# Patient Record
(demographics unavailable — no encounter records)

---

## 2025-04-28 NOTE — PLAN
[No delays noted, anticipatory developmental guidance given.] : No delays noted, anticipatory developmental guidance given.  [Safety counseling given regarding major safety issues for children this age.] : Safety counseling given regarding major safety issues for children this age. [Reading daily was encouraged.] : Reading daily was encouraged.  [FreeTextEntry1] : IVIS will return for a follow up visit when he turns 2 years old.   Yohannes Núñez MD Director, Division of Developmental-Behavioral Pediatrics [facility], [facility] Certified Developmental-Behavioral Pediatrician

## 2025-04-28 NOTE — HISTORY OF PRESENT ILLNESS
[Gestational Age: ___] : Gestational Age in Weeks: [unfilled] [No Parental Concerns] : no parental concerns [No Feeding Issues] : no feeding issues. [Table Food] : table food [Chronological Age: ___] : Chronological Age in Months: [unfilled] [Corrected Age: ___] : Corrected Age: [unfilled] [Pulmonology: ___] : Pulmonology: [unfilled] [Normal] : normal [None] : none

## 2025-04-28 NOTE — PHYSICAL EXAM
[Unfisted] : unfisted [Manipulates Fingers] : manipulates fingers [Transfer] : transfers objects [Mature Pincer] : has mature pincer [Voluntary Release] : voluntary release  [Finger Feeding] : finger feeding  [Alert To Sounds] : alert to sounds [Soothes When Picked Up] : soothes when picked up  [Social Smile] : has a social smile [Orients To Voice] : orients to voice [Gesture Language] : gestures language [Understands "No"] : understands "No" [1 Step Command with Gesture] : follows 1 step commands with gesture ["Vitaliy" Appropriately] : says "Vitaliy" appropriately [Vocabulary Of ___ Words] : has a vocabulary of [unfilled] words [Normal] : attention level, irritability, interaction and responsivity appropriate for age [Walk Alone] : walks alone [Run] : runs [Spoon] : uses a spoon [Cup] : uses a cup ["Mama" Appropriately] : says "Mama" appropriately [Mature Jargoning] : uses mature jargoning [2 Word Phrases] : uses 2 word phrases [de-identified] : stoop and recover, climb into big chair [de-identified] : brachiocephalic, intact extraocular movements observed, nonicteric sclera bilaterally [de-identified] : full range of motion noted by observation [de-identified] : full range of motion of upper and lower extremities  [de-identified] : awake and interactive

## 2025-04-28 NOTE — REASON FOR VISIT
[Follow-Up ] : a  follow-up for [Parents] : parents [Other: _____] : [unfilled] [FreeTextEntry3] : neurodevelopmental evaluation secondary to being at high risk for developmental delays given history of prematurity.

## 2025-04-28 NOTE — BIRTH HISTORY
[At ___ Weeks Gestation] : at [unfilled] weeks gestation [ Section] : by  section [FreeTextEntry1] : 2380 g (75%ile)   Length - 42 cm (22%ile)   Head Circumference - 32 cm (82%ile) [FreeTextEntry5] : maternal history of Chronic HTN on Triamterene and HCTZ daily. Celestone x 2. Prenatal and Intrapartum RPR negative 12/29/22 and NR on 6/9/23, Hep B negative 12/29/22, HIV negative 6/10/23, Rubella Immune 12/29/22, GBS positive but ruptured at delivery, UDS negative [FreeTextEntry3] : NICU stay of 15 days: CXR was normal, CPAP DOL1-2 then HFNC DOL2-DOL 5 and on room air DOL 5. hyperbilirubinemia required phototherapy from DOL1-3. hypoglycemia required D10 IV push x2 and D10W IVF from DOL1-2 and full feeds DOL5.  Passed bilateral  hearing screen and CCHD screen.